# Patient Record
(demographics unavailable — no encounter records)

---

## 2024-10-27 NOTE — PHYSICAL EXAM
[Well-appearing] : well-appearing [Alert] : alert [Well related, good eye contact] : well related, good eye contact [Conversant] : conversant [Normal speech and language] : normal speech and language [Follows instructions well] : follows instructions well [Full extraocular movements] : full extraocular movements [Saccadic and smooth pursuits intact] : saccadic and smooth pursuits intact [No nystagmus] : no nystagmus [No facial asymmetry or weakness] : no facial asymmetry or weakness [Gross hearing intact] : gross hearing intact [Good shoulder shrug] : good shoulder shrug [Normal tongue movement] : normal tongue movement [No pronator drift] : no pronator drift [Normal finger tapping and fine finger movements] : normal finger tapping and fine finger movements [No abnormal involuntary movements] : no abnormal involuntary movements [Walks and runs well] : walks and runs well [Able to walk on heels] : able to walk on heels [Able to walk on toes] : able to walk on toes [No dysmetria on FTNT] : no dysmetria on FTNT [Normal gait] : normal gait [Negative Romberg] : negative Romberg

## 2024-10-30 NOTE — DATA REVIEWED
[FreeTextEntry1] : EXAM: [id number] - MR BRAIN  - ORDERED BY:  SPEEDY CASPER PROCEDURE DATE:  03/15/2024 INTERPRETATION:  Exam: MR of the brain without contrast CLINICAL INDICATION: [headaches ] COMPARISON: [None] TECHNIQUE: Multiplanar multisequence images were acquired through the brain without contrast FINDINGS: Sagittal images show normal midline structures. No masses. No congenital abnormalities or tonsillar ectopia.  Normal ventricular size and configuration. No pathologic extracerebral fluid collections. No areas of abnormal signal intensity within the brain parenchyma. Normal basal ganglia, brainstem, and cerebellum.  No pathologic T2 shortening within the brain parenchyma. No mesial temporal sclerosis.  Grossly normal signal voids within major intracranial vascular structures.  Paranasal sinuses are adequately aerated. No orbital abnormalities.  IMPRESSION: [Normal MR of the brain].  No sinus disease  --- End of Report ---  JOEL DIGGS MD; Attending Radiologist This document has been electronically signed. Mar 15 2024  3:22PM

## 2024-10-30 NOTE — CONSULT LETTER
[FreeTextEntry3] : Tracie Johnson M.D Pediatric neurology attending Neurofibromatosis clinic Co-director [facility] Tel: [phone] Fax: [phone]

## 2024-10-30 NOTE — PLAN
[FreeTextEntry1] : Follow up 6-8 weeks after starting Topamax Consider consultation with Dr. Sheridan if no pain relief at next visit All questions answered; both report understanding and agree with plan

## 2024-10-30 NOTE — ASSESSMENT
[FreeTextEntry1] : 17 year old girl seen here in Feb 2024 for new onset almost daily headaches since early Jan 2024; PMD prescribed then 2 courses of antibiotics. Pain was frontal and overall was getting better. She missed school because of that. It was suspected she was overusing NSAIDs. Brain MRI 3/15/24 normal, no sinus disease. She returned in Sept 2024 because she missed few school days because of a HA that lasted 4-5 days. I prescribed Migravent, Rizatriptan 5 mg and advised migraine cocktail in the ER for acute HAs. Seen in the ER 10/22/24 and 10/24/24 for migraines; treated with migraine cocktail with partial response. Routine labs in the ER, CBC and CMP normal. She reports that the HA persists. She missed school last week and today because of an ongoing headache. Rizatriptan helped to some degree and she did not have side effects. She is also on daily Migravent x 1 month. As per mother, PMD thought to obtain HCT for sinuses. Exam limited by TEB but appears non focal.  [] I do not recommend Head CT; Brain MRI was unremarkable; however, if PMD wants to get a HCT she may do so (mother reports that ENT also thought there were no sinus issues) [] increase Rizatriptan to 10 mg tabs, one tab dissolvable at migraine onset [] may try Fioricet 1 tab this evening and another one tomorrow night to break current ongoing migraine; reviewed potential side effects including drowsiness, risk of addiction and abuse. I advised TAM not to drive when taking Fioricet. I do not recommend using it long term.  [] discussed different preventive meds including Inderal, Topamax, Elavil. Reviewed potential side effects. Mother states TAM's friend was taking Elavil and she had side effects, so mother does not want to try that. TAM has history of asthma so I would not recommend Inderal. I will prescribe Topamax. TAM had CBC and CMP in the ER last week, labs were normal. Start Topamax 25 mg daily x 1 week, and will titrate by 25 mg / day weekly to max of 50 mg bid  [] at this time mother wants to continue Migravent for the next 2-3 weeks and only if no relief, she will start Topamax [] reviewed CGRP receptor antagonist and monocloncal CGRP antibodies that is available for adults and discussed  Botox ; if wishing to consider that, recommend consultation with Dr. Sheridan.

## 2025-06-09 NOTE — BIRTH HISTORY
[At Term] : at term [ Section] : by  section [Age Appropriate] : age appropriate developmental milestones met [None] : there were no delivery complications

## 2025-06-09 NOTE — PHYSICAL EXAM
[Well Nourished] : well nourished [Alert] : ~L alert [Well Developed] : well developed [No Respiratory Distress] : no respiratory distress [Active] : active [Normal Breathing Pattern] : normal breathing pattern [No Allergic Shiners] : no allergic shiners [No Drainage] : no drainage [No Conjunctivitis] : no conjunctivitis [Tympanic Membranes Clear] : tympanic membranes were clear [No Nasal Drainage] : no nasal drainage [No Polyps] : no polyps [No Sinus Tenderness] : no sinus tenderness [No Oral Cyanosis] : no oral cyanosis [No Oral Pallor] : no oral pallor [Non-Erythematous] : non-erythematous [No Postnasal Drip] : no postnasal drip [No Exudates] : no exudates [No Tonsillar Enlargement] : no tonsillar enlargement [Absence Of Retractions] : absence of retractions [Symmetric] : symmetric [Good Expansion] : good expansion [No Acc Muscle Use] : no accessory muscle use [Good aeration to bases] : good aeration to bases [Equal Breath Sounds] : equal breath sounds bilaterally [No Crackles] : no crackles [No Rhonchi] : no rhonchi [No Wheezing] : no wheezing [Normal Sinus Rhythm] : normal sinus rhythm [No Heart Murmur] : no heart murmur [Soft, Non-Tender] : soft, non-tender [Non Distended] : was not ~L distended [No Hepatosplenomegaly] : no hepatosplenomegaly [Abdomen Mass (___ Cm)] : no abdominal mass palpated [Full ROM] : full range of motion [No Clubbing] : no clubbing [No Cyanosis] : no cyanosis [Capillary Refill < 2 secs] : capillary refill less than two seconds [No Petechiae] : no petechiae [No Kyphoscoliosis] : no kyphoscoliosis [No Contractures] : no contractures [No Rashes] : no rashes [Alert and  Oriented] : alert and oriented [FreeTextEntry7] : hypoventilated throughout

## 2025-06-09 NOTE — PHYSICAL EXAM
[Well Nourished] : well nourished [Well Developed] : well developed [Alert] : ~L alert [Active] : active [No Respiratory Distress] : no respiratory distress [Normal Breathing Pattern] : normal breathing pattern [No Allergic Shiners] : no allergic shiners [No Drainage] : no drainage [Tympanic Membranes Clear] : tympanic membranes were clear [No Conjunctivitis] : no conjunctivitis [No Nasal Drainage] : no nasal drainage [No Polyps] : no polyps [No Sinus Tenderness] : no sinus tenderness [No Oral Cyanosis] : no oral cyanosis [No Oral Pallor] : no oral pallor [Non-Erythematous] : non-erythematous [No Exudates] : no exudates [No Postnasal Drip] : no postnasal drip [No Tonsillar Enlargement] : no tonsillar enlargement [Absence Of Retractions] : absence of retractions [Good Expansion] : good expansion [Symmetric] : symmetric [No Acc Muscle Use] : no accessory muscle use [Good aeration to bases] : good aeration to bases [Equal Breath Sounds] : equal breath sounds bilaterally [No Crackles] : no crackles [No Rhonchi] : no rhonchi [No Wheezing] : no wheezing [Normal Sinus Rhythm] : normal sinus rhythm [No Heart Murmur] : no heart murmur [Soft, Non-Tender] : soft, non-tender [No Hepatosplenomegaly] : no hepatosplenomegaly [Non Distended] : was not ~L distended [Abdomen Mass (___ Cm)] : no abdominal mass palpated [Full ROM] : full range of motion [No Clubbing] : no clubbing [Capillary Refill < 2 secs] : capillary refill less than two seconds [No Cyanosis] : no cyanosis [No Petechiae] : no petechiae [No Kyphoscoliosis] : no kyphoscoliosis [No Contractures] : no contractures [Alert and  Oriented] : alert and oriented [No Rashes] : no rashes [FreeTextEntry7] : hypoventilated throughout

## 2025-06-09 NOTE — REASON FOR VISIT
[Routine Follow-Up] : a routine follow-up visit for [Asthma/RAD] : asthma/RAD [Cough] : cough [Patient] : patient [Medical Records] : medical records [Mother] : mother

## 2025-06-09 NOTE — PHYSICAL EXAM
[Well Nourished] : well nourished [Alert] : ~L alert [Well Developed] : well developed [No Respiratory Distress] : no respiratory distress [Normal Breathing Pattern] : normal breathing pattern [Active] : active [No Drainage] : no drainage [No Allergic Shiners] : no allergic shiners [No Conjunctivitis] : no conjunctivitis [Tympanic Membranes Clear] : tympanic membranes were clear [No Polyps] : no polyps [No Nasal Drainage] : no nasal drainage [No Sinus Tenderness] : no sinus tenderness [No Oral Pallor] : no oral pallor [No Oral Cyanosis] : no oral cyanosis [Non-Erythematous] : non-erythematous [No Postnasal Drip] : no postnasal drip [No Exudates] : no exudates [No Tonsillar Enlargement] : no tonsillar enlargement [Absence Of Retractions] : absence of retractions [Good Expansion] : good expansion [Symmetric] : symmetric [No Acc Muscle Use] : no accessory muscle use [Good aeration to bases] : good aeration to bases [Equal Breath Sounds] : equal breath sounds bilaterally [No Crackles] : no crackles [No Wheezing] : no wheezing [No Rhonchi] : no rhonchi [Normal Sinus Rhythm] : normal sinus rhythm [No Heart Murmur] : no heart murmur [Soft, Non-Tender] : soft, non-tender [No Hepatosplenomegaly] : no hepatosplenomegaly [Non Distended] : was not ~L distended [Abdomen Mass (___ Cm)] : no abdominal mass palpated [Full ROM] : full range of motion [No Clubbing] : no clubbing [Capillary Refill < 2 secs] : capillary refill less than two seconds [No Cyanosis] : no cyanosis [No Petechiae] : no petechiae [No Kyphoscoliosis] : no kyphoscoliosis [No Contractures] : no contractures [No Rashes] : no rashes [Alert and  Oriented] : alert and oriented [FreeTextEntry7] : hypoventilated throughout

## 2025-06-10 NOTE — CONSULT LETTER
[Dear  ___] : Dear  [unfilled], [Consult Letter:] : I had the pleasure of evaluating your patient, [unfilled]. [Please see my note below.] : Please see my note below. [Consult Closing:] : Thank you very much for allowing me to participate in the care of this patient.  If you have any questions, please do not hesitate to contact me. [Sincerely,] : Sincerely, [FreeTextEntry3] : If you have any questions please feel free to contact my office at [phone].\par  \par  Sincerely,\par  \par  Melody Weaver, MSN, CPNP-PC\par  Pediatric Nurse Practitioner\par  Division of Pediatric Pulmonary Medicine & Cystic Fibrosis Center\par  [facility]\par

## 2025-06-10 NOTE — CONSULT LETTER
[Dear  ___] : Dear  [unfilled], [Consult Letter:] : I had the pleasure of evaluating your patient, [unfilled]. [Please see my note below.] : Please see my note below. [Consult Closing:] : Thank you very much for allowing me to participate in the care of this patient.  If you have any questions, please do not hesitate to contact me. [Sincerely,] : Sincerely, [FreeTextEntry3] : If you have any questions please feel free to contact my office at [phone].\par  \par  Sincerely,\par  \par  Melody Weaver, MSN, CPNP-PC\par  Pediatric Nurse Practitioner\par  Division of Pediatric Pulmonary Medicine [facility]rosis [facility]\par  \par

## 2025-06-10 NOTE — CONSULT LETTER
[Dear  ___] : Dear  [unfilled], [Consult Letter:] : I had the pleasure of evaluating your patient, [unfilled]. [Please see my note below.] : Please see my note below. [Consult Closing:] : Thank you very much for allowing me to participate in the care of this patient.  If you have any questions, please do not hesitate to contact me. [Sincerely,] : Sincerely, [FreeTextEntry3] : If you have any questions please feel free to contact my office at [phone].\par  \par  Sincerely,\par  \par  Melody Weaver, MSN, CPNP-PC\par  Pediatric Nurse Practitioner\par  Division of Pediatric Pulmonary Medicine & Cystic Fibrosis Center\par  [facility]\par

## 2025-06-10 NOTE — SOCIAL HISTORY
[Mother] : mother [Father] : father [Sister] : sister [House] : [unfilled] lives in a house  [Dog] : dog [Other___] : [unfilled] [Smokers in Household] : there are no smokers in the home

## 2025-06-10 NOTE — HISTORY OF PRESENT ILLNESS
[FreeTextEntry1] : Moderate persistent asthma, allergic rhinitis  Jeet 10, 2025 FOLLOW UP: Interval Hx: -Last seen 2025, recs: Symbicort 160 2 puffs QD -Doing well per patient, used albuterol PRN for viral illnesses in the fall/winter- last in 2025 -Not using Symbicort daily -Will be attending [employer] to study psych in 2025 Daily meds: none Rescue meds: Albuterol PRN  Recent ER visits/hospitalizations: denies  Last oral steroid course: denies  Baseline daytime cough, SOB or wheeze: denies  Baseline nocturnal cough, SOB or wheeze: denies  Exertional cough, SOB or wheeze: occasional, uses albuterol PRN Allergic rhinitis symptoms: yes in the past, uses Zyrtec, flonase PRN Snoring: denies  Flu vaccine [id number]: yes COVID 19 vaccine: yes  ==  2024 FOLLOW UP: Interval Hx: -Last seen 2023; recs: Symbicort 80mcg 2 puffs BID, Flonase QD, Zyrtec QD - 2024- headaches, possibly related to sinus pressure vs TMJ. MRI brain revealed clear sinuses, not concerning. Was seen by outside ENT as well, instructed to cut down on gum chewing. Headaches greatly improved since. - Consistently using Symbicort 80mcg 2 puffs QHS (does not use spacer). Will use in the mornings 2-3 times a week.  - Flonase and Saline nasal spray daily - Asthma previously triggered by extreme heat and humidity. 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 Works once a week.  Allergic rhinitis symptoms: yes, uses Zyrtec, flonase PRN Snoring: denies  Flu vaccine [id number]: yes COVID 19 vaccine: yes    ==  2023 FOLLOW UP: Interval Hx:  -Last seen Oct 2023, recs: Symbicort 80 2 puffs BID -Chest tightness/pain for past 2 weeks, initially tx by PCP with zithromax x 5 days which did not help. Seen again by PCP on , sent rx for prednisone 20mg BID which is helping a little. Using albuterol 1-2x daily which helps Daily meds:  Symbicort 80 2 puffs BID Rescue meds: Albuterol PRN  Recent ER visits/hospitalizations: denies Last oral steroid course:  currently on day 4 of 5 Baseline daytime cough, SOB or wheeze:  denies Baseline nocturnal cough, SOB or wheeze:  denies Exertional cough, SOB or wheeze: denies Allergic rhinitis symptoms: yes Flu vaccine: yes COVID 19 vaccine:  yes ==  Oct 10, 2023 FOLLOW UP; Interval Hx:  -Last seen 2023, recs: Symbicort 80 2 puffs QD x 1 month, if doing well d/c for summer and restart with first URI of the  -Doing well, stopped ICS from August-September, restarted Symbicort end of September for URI -Pt reports increased chest tightness during  weather change -Reports 100% compliance with ICS Daily meds: denies  Rescue meds: Albuterol PRN  Recent ER visits/hospitalizations: denies  Last oral steroid course: 2023  Baseline daytime cough, SOB or wheeze:  denies  Baseline nocturnal cough, SOB or wheeze:  denies  Exertional cough, SOB or wheeze:denies  Allergic rhinitis symptoms: yes, uses zyrtec and flonase PRN  Flu vaccine: will get with PCP COVID 19 vaccine:  yes  ==  2023 FOLLOW UP:  Interval Hx:  -Last seen 2023, recs: Symbicort 80 2 puffs BID, zyrtec, flonase PRN -Doing well, uses albuterol 1x per month for SOB -Using Symbicort 80 2 puffs BID 4 of 7 days  -Endorses allergy symptoms, used zyrtec and flonase PRN Daily meds: Symbicort 80 2 puffs BID  Rescue meds: Albuterol PRN  Recent ER visits/hospitalizations: denies  Last oral steroid course: 2023  Baseline daytime cough, SOB or wheeze:  denies  Baseline nocturnal cough, SOB or wheeze:  denies  Exertional cough, SOB or wheeze:denies  Allergic rhinitis symptoms: yes  Flu vaccine: no COVID 19 vaccine:  yes  ==   Mar 08, 2023 NEW PATIENT  15yr old female adolescent with recurrent cough and wheeze with viral illnesses for past year URI in 2023, "got into her chest", dx with bronchitis started Flovent and Albuterol Sept/Oct 2022 dx with walking PNA Sick again in 2023 Using Flovent 110mcg 1 puff BID - reports 50% compliance when well Denies UCC/ED visits Uses albuterol PRN with good relief Uses zyrtec and flonase PRN - has symptoms during weather changes, never tested  Seen for cardiology , workup was normal  No known COVID 19 illness  Vaccines UTD, no flu shot, COVID 19 x3   PMH:  Denies  PSH: Denies  Meds: zyrtec, flonase Birth Hx:  FT,  (repeat), denies complications PCP/Specialists:  Dr. Gilliam  Family hx:  Mo- Healthy Fa- Healthy Sister, 18yo- Healthy Family hx of asthma: denies  Family hx of cystic fibrosis, autoimmune disease, recurrent respiratory infections: denies  Feeding issues, FRANCES: denies  Hx of Eczema: denies  Hx of rhinitis, post nasal drip:  Zyrtec and Flonase, never tested  Hx of recurrent infections (ie: pneumonia, AOM, sinusitis): x1 Seen by pulmonologist before: denies   Cough Hx: Triggers: denies  Allergies:  yes suspected  Hx of wheezing: yes  Use of oral steroids: yes  x2 ED/Hospitalizations:  denies   Snoring:  denies  Daytime cough: denies  Nighttime cough:  denies  Respiratory symptoms with exercise: yes occasional SOB Chest x-ray: yes , dx with bronchitis in    Modified Asthma Predictive Index (mAPI): 4 wheezing illnesses AND 1 major criteria: Parental asthma   NO  atopic dermatitis   NO aeroallergen sensitization  yes suspected  OR  2 minor criteria: Food sensitization   NO  peripheral blood eosinophilia =4%  NO  wheezing apart from colds   NO     Interval Hx: -Last seen  -Doing well - Daily meds: Rescue meds: Albuterol PRN Recent ER visits/hospitalizations: Last oral steroid course: Baseline daytime cough, SOB or wheeze: Baseline nocturnal cough, SOB or wheeze: Exertional cough, SOB or wheeze: Allergic rhinitis symptoms: Flu vaccine [id number]: COVID 19 vaccine: Misc notes: ==

## 2025-06-10 NOTE — HISTORY OF PRESENT ILLNESS
[FreeTextEntry1] : Moderate persistent asthma, allergic rhinitis  Jeet 10, 2025 FOLLOW UP: Interval Hx: -Last seen 2025, recs: Symbicort 160 2 puffs QD -Doing well per patient, used albuterol PRN for viral illnesses in the fall/winter- last in 2025 -Not using Symbicort daily -Will be attending [place] to study psych in 2025 Daily meds: none Rescue meds: Albuterol PRN  Recent ER visits/hospitalizations: denies  Last oral steroid course: denies  Baseline daytime cough, SOB or wheeze: denies  Baseline nocturnal cough, SOB or wheeze: denies  Exertional cough, SOB or wheeze: occasional, uses albuterol PRN Allergic rhinitis symptoms: yes in the past, uses Zyrtec, flonase PRN Snoring: denies  Flu vaccine [id number]: yes COVID 19 vaccine: yes  ==  2024 FOLLOW UP: Interval Hx: -Last seen 2023; recs: Symbicort 80mcg 2 puffs BID, Flonase QD, Zyrtec QD - 2024- headaches, possibly related to sinus pressure vs TMJ. MRI brain revealed clear sinuses, not concerning. Was seen by outside ENT as well, instructed to cut down on gum chewing. Headaches greatly improved since. - Consistently using Symbicort 80mcg 2 puffs QHS (does not use spacer). Will use in the mornings 2-3 times a week.  - Flonase and Saline nasal spray daily - Asthma previously triggered by extreme heat and humidity. 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 Works once a week.  Allergic rhinitis symptoms: yes, uses Zyrtec, flonase PRN Snoring: denies  Flu vaccine [id number]: yes COVID 19 vaccine: yes    ==  2023 FOLLOW UP: Interval Hx:  -Last seen Oct 2023, recs: Symbicort 80 2 puffs BID -Chest tightness/pain for past 2 weeks, initially tx by PCP with zithromax x 5 days which did not help. Seen again by PCP on , sent rx for prednisone 20mg BID which is helping a little. Using albuterol 1-2x daily which helps Daily meds:  Symbicort 80 2 puffs BID Rescue meds: Albuterol PRN  Recent ER visits/hospitalizations: denies Last oral steroid course:  currently on day 4 of 5 Baseline daytime cough, SOB or wheeze:  denies Baseline nocturnal cough, SOB or wheeze:  denies Exertional cough, SOB or wheeze: denies Allergic rhinitis symptoms: yes Flu vaccine: yes COVID 19 vaccine:  yes ==  Oct 10, 2023 FOLLOW UP; Interval Hx:  -Last seen 2023, recs: Symbicort 80 2 puffs QD x 1 month, if doing well d/c for summer and restart with first URI of the  -Doing well, stopped ICS from August-September, restarted Symbicort end of September for URI -Pt reports increased chest tightness during  weather change -Reports 100% compliance with ICS Daily meds: denies  Rescue meds: Albuterol PRN  Recent ER visits/hospitalizations: denies  Last oral steroid course: 2023  Baseline daytime cough, SOB or wheeze:  denies  Baseline nocturnal cough, SOB or wheeze:  denies  Exertional cough, SOB or wheeze:denies  Allergic rhinitis symptoms: yes, uses zyrtec and flonase PRN  Flu vaccine: will get with PCP COVID 19 vaccine:  yes  ==  2023 FOLLOW UP:  Interval Hx:  -Last seen 2023, recs: Symbicort 80 2 puffs BID, zyrtec, flonase PRN -Doing well, uses albuterol 1x per month for SOB -Using Symbicort 80 2 puffs BID 4 of 7 days  -Endorses allergy symptoms, used zyrtec and flonase PRN Daily meds: Symbicort 80 2 puffs BID  Rescue meds: Albuterol PRN  Recent ER visits/hospitalizations: denies  Last oral steroid course: 2023  Baseline daytime cough, SOB or wheeze:  denies  Baseline nocturnal cough, SOB or wheeze:  denies  Exertional cough, SOB or wheeze:denies  Allergic rhinitis symptoms: yes  Flu vaccine: no COVID 19 vaccine:  yes  ==   Mar 08, 2023 NEW PATIENT  15yr old female adolescent with recurrent cough and wheeze with viral illnesses for past year URI in 2023, "got into her chest", dx with bronchitis started Flovent and Albuterol Sept/Oct 2022 dx with walking PNA Sick again in 2023 Using Flovent 110mcg 1 puff BID - reports 50% compliance when well Denies UCC/ED visits Uses albuterol PRN with good relief Uses zyrtec and flonase PRN - has symptoms during weather changes, never tested  Seen for cardiology , workup was normal  No known COVID 19 illness  Vaccines UTD, no flu shot, COVID 19 x3   PMH:  Denies  PSH: Denies  Meds: zyrtec, flonase Birth Hx:  FT,  (repeat), denies complications PCP/Specialists:  Dr. Gilliam  Family hx:  Mo- Healthy Fa- Healthy Sister, 18yo- Healthy Family hx of asthma: denies  Family hx of cystic fibrosis, autoimmune disease, recurrent respiratory infections: denies  Feeding issues, FRANCES: denies  Hx of Eczema: denies  Hx of rhinitis, post nasal drip:  Zyrtec and Flonase, never tested  Hx of recurrent infections (ie: pneumonia, AOM, sinusitis): x1 Seen by pulmonologist before: denies   Cough Hx: Triggers: denies  Allergies:  yes suspected  Hx of wheezing: yes  Use of oral steroids: yes  x2 ED/Hospitalizations:  denies   Snoring:  denies  Daytime cough: denies  Nighttime cough:  denies  Respiratory symptoms with exercise: yes occasional SOB Chest x-ray: yes , dx with bronchitis in    Modified Asthma Predictive Index (mAPI): 4 wheezing illnesses AND 1 major criteria: Parental asthma   NO  atopic dermatitis   NO aeroallergen sensitization  yes suspected  OR  2 minor criteria: Food sensitization   NO  peripheral blood eosinophilia =4%  NO  wheezing apart from colds   NO     Interval Hx: -Last seen  -Doing well - Daily meds: Rescue meds: Albuterol PRN Recent ER visits/hospitalizations: Last oral steroid course: Baseline daytime cough, SOB or wheeze: Baseline nocturnal cough, SOB or wheeze: Exertional cough, SOB or wheeze: Allergic rhinitis symptoms: Flu vaccine [id number]: COVID 19 vaccine: Misc notes: ==

## 2025-06-10 NOTE — HISTORY OF PRESENT ILLNESS
[FreeTextEntry1] : Moderate persistent asthma, allergic rhinitis  Jeet 10, 2025 FOLLOW UP: Interval Hx: -Last seen 2025, recs: Symbicort 160 2 puffs QD -Doing well per patient, used albuterol PRN for viral illnesses in the fall/winter- last in 2025 -Not using Symbicort daily -Will be attending [place] to study psych in 2025 Daily meds: none Rescue meds: Albuterol PRN  Recent ER visits/hospitalizations: denies  Last oral steroid course: denies  Baseline daytime cough, SOB or wheeze: denies  Baseline nocturnal cough, SOB or wheeze: denies  Exertional cough, SOB or wheeze: occasional, uses albuterol PRN Allergic rhinitis symptoms: yes in the past, uses Zyrtec, flonase PRN Snoring: denies  Flu vaccine [id number]: yes COVID 19 vaccine: yes  ==  2024 FOLLOW UP: Interval Hx: -Last seen 2023; recs: Symbicort 80mcg 2 puffs BID, Flonase QD, Zyrtec QD - 2024- headaches, possibly related to sinus pressure vs TMJ. MRI brain revealed clear sinuses, not concerning. Was seen by outside ENT as well, instructed to cut down on gum chewing. Headaches greatly improved since. - Consistently using Symbicort 80mcg 2 puffs QHS (does not use spacer). Will use in the mornings 2-3 times a week.  - Flonase and Saline nasal spray daily - Asthma previously triggered by extreme heat and humidity. 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 Works once a week.  Allergic rhinitis symptoms: yes, uses Zyrtec, flonase PRN Snoring: denies  Flu vaccine [id number]: yes COVID 19 vaccine: yes    ==  2023 FOLLOW UP: Interval Hx:  -Last seen Oct 2023, recs: Symbicort 80 2 puffs BID -Chest tightness/pain for past 2 weeks, initially tx by PCP with zithromax x 5 days which did not help. Seen again by PCP on , sent rx for prednisone 20mg BID which is helping a little. Using albuterol 1-2x daily which helps Daily meds:  Symbicort 80 2 puffs BID Rescue meds: Albuterol PRN  Recent ER visits/hospitalizations: denies Last oral steroid course:  currently on day 4 of 5 Baseline daytime cough, SOB or wheeze:  denies Baseline nocturnal cough, SOB or wheeze:  denies Exertional cough, SOB or wheeze: denies Allergic rhinitis symptoms: yes Flu vaccine: yes COVID 19 vaccine:  yes ==  Oct 10, 2023 FOLLOW UP; Interval Hx:  -Last seen 2023, recs: Symbicort 80 2 puffs QD x 1 month, if doing well d/c for summer and restart with first URI of the  -Doing well, stopped ICS from August-September, restarted Symbicort end of September for URI -Pt reports increased chest tightness during  weather change -Reports 100% compliance with ICS Daily meds: denies  Rescue meds: Albuterol PRN  Recent ER visits/hospitalizations: denies  Last oral steroid course: 2023  Baseline daytime cough, SOB or wheeze:  denies  Baseline nocturnal cough, SOB or wheeze:  denies  Exertional cough, SOB or wheeze:denies  Allergic rhinitis symptoms: yes, uses zyrtec and flonase PRN  Flu vaccine: will get with PCP COVID 19 vaccine:  yes  ==  2023 FOLLOW UP:  Interval Hx:  -Last seen 2023, recs: Symbicort 80 2 puffs BID, zyrtec, flonase PRN -Doing well, uses albuterol 1x per month for SOB -Using Symbicort 80 2 puffs BID 4 of 7 days  -Endorses allergy symptoms, used zyrtec and flonase PRN Daily meds: Symbicort 80 2 puffs BID  Rescue meds: Albuterol PRN  Recent ER visits/hospitalizations: denies  Last oral steroid course: 2023  Baseline daytime cough, SOB or wheeze:  denies  Baseline nocturnal cough, SOB or wheeze:  denies  Exertional cough, SOB or wheeze:denies  Allergic rhinitis symptoms: yes  Flu vaccine: no COVID 19 vaccine:  yes  ==   Mar 08, 2023 NEW PATIENT  15yr old female adolescent with recurrent cough and wheeze with viral illnesses for past year URI in 2023, "got into her chest", dx with bronchitis started Flovent and Albuterol Sept/Oct 2022 dx with walking PNA Sick again in 2023 Using Flovent 110mcg 1 puff BID - reports 50% compliance when well Denies UCC/ED visits Uses albuterol PRN with good relief Uses zyrtec and flonase PRN - has symptoms during weather changes, never tested  Seen for cardiology , workup was normal  No known COVID 19 illness  Vaccines UTD, no flu shot, COVID 19 x3   PMH:  Denies  PSH: Denies  Meds: zyrtec, flonase Birth Hx:  FT,  (repeat), denies complications PCP/Specialists:  Dr. Gilliam  Family hx:  Mo- Healthy Fa- Healthy Sister, 18yo- Healthy Family hx of asthma: denies  Family hx of cystic fibrosis, autoimmune disease, recurrent respiratory infections: denies  Feeding issues, FRANCES: denies  Hx of Eczema: denies  Hx of rhinitis, post nasal drip:  Zyrtec and Flonase, never tested  Hx of recurrent infections (ie: pneumonia, AOM, sinusitis): x1 Seen by pulmonologist before: denies   Cough Hx: Triggers: denies  Allergies:  yes suspected  Hx of wheezing: yes  Use of oral steroids: yes  x2 ED/Hospitalizations:  denies   Snoring:  denies  Daytime cough: denies  Nighttime cough:  denies  Respiratory symptoms with exercise: yes occasional SOB Chest x-ray: yes , dx with bronchitis in    Modified Asthma Predictive Index (mAPI): 4 wheezing illnesses AND 1 major criteria: Parental asthma   NO  atopic dermatitis   NO aeroallergen sensitization  yes suspected  OR  2 minor criteria: Food sensitization   NO  peripheral blood eosinophilia =4%  NO  wheezing apart from colds   NO     Interval Hx: -Last seen  -Doing well - Daily meds: Rescue meds: Albuterol PRN Recent ER visits/hospitalizations: Last oral steroid course: Baseline daytime cough, SOB or wheeze: Baseline nocturnal cough, SOB or wheeze: Exertional cough, SOB or wheeze: Allergic rhinitis symptoms: Flu vaccine [id number]: COVID 19 vaccine: Misc notes: ==